# Patient Record
(demographics unavailable — no encounter records)

---

## 2024-12-15 NOTE — PHYSICAL EXAM
[Well Developed] : well developed [Well Nourished] : well nourished [No Acute Distress] : no acute distress [Normal Conjunctiva] : normal conjunctiva [Normal Venous Pressure] : normal venous pressure [No Carotid Bruit] : no carotid bruit [Normal S1, S2] : normal S1, S2 [No Rub] : no rub [No Gallop] : no gallop [Clear Lung Fields] : clear lung fields [Good Air Entry] : good air entry [No Respiratory Distress] : no respiratory distress  [Soft] : abdomen soft [No Masses/organomegaly] : no masses/organomegaly [Non Tender] : non-tender [Normal Bowel Sounds] : normal bowel sounds [Normal Gait] : normal gait [No Edema] : no edema [No Cyanosis] : no cyanosis [No Clubbing] : no clubbing [No Varicosities] : no varicosities [No Rash] : no rash [No Skin Lesions] : no skin lesions [Moves all extremities] : moves all extremities [No Focal Deficits] : no focal deficits [Normal Speech] : normal speech [Alert and Oriented] : alert and oriented [Normal memory] : normal memory [Murmur] : murmur

## 2024-12-15 NOTE — DISCUSSION/SUMMARY
[FreeTextEntry1] : 1) Prior CABG. Prior stents. Mild ECG changes. - Plan on Stress test 2) Murmur - Echo 3) US doppler  [EKG obtained to assist in diagnosis and management of assessed problem(s)] : EKG obtained to assist in diagnosis and management of assessed problem(s)

## 2024-12-15 NOTE — DISCUSSION/SUMMARY
[EKG obtained to assist in diagnosis and management of assessed problem(s)] : EKG obtained to assist in diagnosis and management of assessed problem(s) [FreeTextEntry1] : 1) Prior CABG. Prior stents. Mild ECG changes. - Plan on Stress test 2) Murmur - Echo 3) US doppler

## 2024-12-15 NOTE — HISTORY OF PRESENT ILLNESS
[FreeTextEntry1] : [employer] [employer] is returning  to the office   Feels well  No CP No LE edema No palps No syncope No falls No bleeding

## 2025-04-10 NOTE — DISCUSSION/SUMMARY
[FreeTextEntry1] : 1) Prior CABG. Prior stents. Mild ECG changes. - Plan on Stress test 2) Murmur - Echo 3) US doppler